# Patient Record
Sex: MALE | Race: WHITE
[De-identification: names, ages, dates, MRNs, and addresses within clinical notes are randomized per-mention and may not be internally consistent; named-entity substitution may affect disease eponyms.]

---

## 2018-02-01 ENCOUNTER — HOSPITAL ENCOUNTER (OUTPATIENT)
Dept: HOSPITAL 25 - OREAST | Age: 63
Discharge: HOME | End: 2018-02-01
Attending: ORTHOPAEDIC SURGERY
Payer: COMMERCIAL

## 2018-02-01 VITALS — DIASTOLIC BLOOD PRESSURE: 86 MMHG | SYSTOLIC BLOOD PRESSURE: 140 MMHG

## 2018-02-01 DIAGNOSIS — F32.9: ICD-10-CM

## 2018-02-01 DIAGNOSIS — I10: ICD-10-CM

## 2018-02-01 DIAGNOSIS — F41.9: ICD-10-CM

## 2018-02-01 DIAGNOSIS — Z88.8: ICD-10-CM

## 2018-02-01 DIAGNOSIS — M65.321: Primary | ICD-10-CM

## 2018-02-02 NOTE — OP
DATE OF OPERATION:  02/01/18 [facility]

 

DATE OF BIRTH:  [birth year]

 

SURGEON:  Francois Smith MD

 

ASSISTANT:  None.

 

ANESTHESIOLOGIST:  None.

 

ANESTHESIA:  Local only with 1% lidocaine with epinephrine and bicarbonate.

 

PRE-OP DIAGNOSIS:  Right index trigger finger.

 

POST-OP DIAGNOSIS:  Right index trigger finger.

 

OPERATIVE PROCEDURE:  Right index trigger finger release.

 

INDICATIONS:  Yanick has had a progressive trigger finger that has recurred 
after steroid injections.  It was very painful to him.  We talked about risks 
and benefits.  He wanted to proceed with the surgery.

 

ESTIMATED BLOOD LOSS:  2 mL.

 

COMPLICATIONS:  None.

 

FINDINGS:  As expected.

 

DESCRIPTION OF PROCEDURE:  Yanick was seen in the preoperative holding area.  
The correct side, site and procedure were identified.  We came back to the 
operating room.  The arm was then prepped and draped in the usual fashion.  A 
time-out was performed.

 

I made a 1 cm longitudinal incision over the A1 pulley of the right index 
finger. Full thickness flaps were bluntly raised off of the tendon sheath.  The 
13-blade was used to release the tendon sheath just slightly radial off the 
midline in line with the tendon.  The release was completed distally and 
proximally with the tenotomy scissors.  Once there was absolutely no 
compression on the nerve, I had him flex and extend the finger multiple times.  
There was no more triggering.  We irrigated out the wound.  Skin was closed 
with 4-0 nylon suture.  Wound was dressed with Xeroform, 4x4s, Mastisol, and 
Tegaderm dressing.  He was then taken to the recovery room in stable condition.

 

 [id number]/CPS #: [id number]

MTDD